# Patient Record
Sex: FEMALE
[De-identification: names, ages, dates, MRNs, and addresses within clinical notes are randomized per-mention and may not be internally consistent; named-entity substitution may affect disease eponyms.]

---

## 2018-06-20 PROBLEM — Z00.00 ENCOUNTER FOR PREVENTIVE HEALTH EXAMINATION: Status: ACTIVE | Noted: 2018-06-20

## 2018-06-21 ENCOUNTER — APPOINTMENT (OUTPATIENT)
Dept: OBGYN | Facility: CLINIC | Age: 28
End: 2018-06-21
Payer: COMMERCIAL

## 2018-06-21 VITALS
BODY MASS INDEX: 19.92 KG/M2 | DIASTOLIC BLOOD PRESSURE: 69 MMHG | WEIGHT: 111 LBS | SYSTOLIC BLOOD PRESSURE: 103 MMHG | HEIGHT: 62.5 IN | HEART RATE: 76 BPM

## 2018-06-21 DIAGNOSIS — Z01.419 ENCOUNTER FOR GYNECOLOGICAL EXAMINATION (GENERAL) (ROUTINE) W/OUT ABNORMAL FINDINGS: ICD-10-CM

## 2018-06-21 DIAGNOSIS — Z83.3 FAMILY HISTORY OF DIABETES MELLITUS: ICD-10-CM

## 2018-06-21 DIAGNOSIS — N76.0 ACUTE VAGINITIS: ICD-10-CM

## 2018-06-21 DIAGNOSIS — Z78.9 OTHER SPECIFIED HEALTH STATUS: ICD-10-CM

## 2018-06-21 PROCEDURE — 99385 PREV VISIT NEW AGE 18-39: CPT

## 2018-06-21 PROCEDURE — 99213 OFFICE O/P EST LOW 20 MIN: CPT | Mod: 25

## 2018-07-08 PROBLEM — Z83.3 FAMILY HISTORY OF DIABETES MELLITUS: Status: ACTIVE | Noted: 2018-07-08

## 2018-07-08 LAB
C TRACH RRNA SPEC QL NAA+PROBE: NOT DETECTED
CANDIDA VAG CYTO: DETECTED
CYTOLOGY CVX/VAG DOC THIN PREP: NORMAL
G VAGINALIS+PREV SP MTYP VAG QL MICRO: NOT DETECTED
HBV SURFACE AG SER QL: NONREACTIVE
HCV AB SER QL: NONREACTIVE
HCV S/CO RATIO: 0.17 S/CO
HIV1+2 AB SPEC QL IA.RAPID: NONREACTIVE
N GONORRHOEA RRNA SPEC QL NAA+PROBE: NOT DETECTED
SOURCE AMPLIFICATION: NORMAL
T PALLIDUM AB SER QL IA: NEGATIVE
T VAGINALIS VAG QL WET PREP: NOT DETECTED

## 2018-07-08 RX ORDER — NITROFURANTOIN (MONOHYDRATE/MACROCRYSTALS) 25; 75 MG/1; MG/1
100 CAPSULE ORAL
Qty: 14 | Refills: 0 | Status: COMPLETED | COMMUNITY
Start: 2018-05-23

## 2018-07-08 RX ORDER — METRONIDAZOLE 7.5 MG/G
0.75 GEL VAGINAL
Qty: 70 | Refills: 0 | Status: COMPLETED | COMMUNITY
Start: 2018-05-25

## 2018-07-08 RX ORDER — METHYLPREDNISOLONE 4 MG/1
4 TABLET ORAL
Qty: 21 | Refills: 0 | Status: COMPLETED | COMMUNITY
Start: 2018-05-25

## 2018-07-08 RX ORDER — FLUCONAZOLE 150 MG/1
150 TABLET ORAL
Qty: 1 | Refills: 0 | Status: COMPLETED | COMMUNITY
Start: 2018-05-23

## 2018-07-11 ENCOUNTER — OTHER (OUTPATIENT)
Age: 28
End: 2018-07-11

## 2018-07-19 ENCOUNTER — OTHER (OUTPATIENT)
Age: 28
End: 2018-07-19

## 2018-11-27 ENCOUNTER — EMERGENCY (EMERGENCY)
Facility: HOSPITAL | Age: 28
LOS: 1 days | Discharge: HOME | End: 2018-11-27
Attending: EMERGENCY MEDICINE
Payer: COMMERCIAL

## 2018-11-27 VITALS
TEMPERATURE: 99 F | HEART RATE: 81 BPM | DIASTOLIC BLOOD PRESSURE: 73 MMHG | RESPIRATION RATE: 18 BRPM | OXYGEN SATURATION: 99 % | SYSTOLIC BLOOD PRESSURE: 108 MMHG

## 2018-11-27 VITALS
HEART RATE: 74 BPM | TEMPERATURE: 98 F | WEIGHT: 110.01 LBS | OXYGEN SATURATION: 98 % | SYSTOLIC BLOOD PRESSURE: 119 MMHG | RESPIRATION RATE: 18 BRPM | DIASTOLIC BLOOD PRESSURE: 78 MMHG | HEIGHT: 72 IN

## 2018-11-27 LAB
APPEARANCE UR: ABNORMAL
BACTERIA # UR AUTO: ABNORMAL
BILIRUB UR-MCNC: ABNORMAL
COLOR SPEC: ABNORMAL
DIFF PNL FLD: ABNORMAL
EPI CELLS # UR: 1 — SIGNIFICANT CHANGE UP
GLUCOSE UR QL: NEGATIVE — SIGNIFICANT CHANGE UP
HYALINE CASTS # UR AUTO: 3 /LPF — SIGNIFICANT CHANGE UP (ref 0–7)
KETONES UR-MCNC: ABNORMAL
LEUKOCYTE ESTERASE UR-ACNC: ABNORMAL
NITRITE UR-MCNC: POSITIVE
PH UR: 6.5 — SIGNIFICANT CHANGE UP (ref 5–8)
PROT UR-MCNC: ABNORMAL
RBC CASTS # UR COMP ASSIST: 221 /HPF — HIGH (ref 0–4)
SP GR SPEC: 1.02 — SIGNIFICANT CHANGE UP (ref 1.01–1.02)
UROBILINOGEN FLD QL: ABNORMAL
WBC UR QL: 2333 /HPF — HIGH (ref 0–5)

## 2018-11-27 PROCEDURE — 99283 EMERGENCY DEPT VISIT LOW MDM: CPT

## 2018-11-27 PROCEDURE — 87086 URINE CULTURE/COLONY COUNT: CPT

## 2018-11-27 PROCEDURE — 87186 SC STD MICRODIL/AGAR DIL: CPT

## 2018-11-27 PROCEDURE — 81001 URINALYSIS AUTO W/SCOPE: CPT

## 2018-11-27 RX ORDER — CEPHALEXIN 500 MG
500 CAPSULE ORAL ONCE
Qty: 0 | Refills: 0 | Status: COMPLETED | OUTPATIENT
Start: 2018-11-27 | End: 2018-11-27

## 2018-11-27 RX ORDER — IBUPROFEN 200 MG
600 TABLET ORAL ONCE
Qty: 0 | Refills: 0 | Status: COMPLETED | OUTPATIENT
Start: 2018-11-27 | End: 2018-11-27

## 2018-11-27 RX ORDER — CEPHALEXIN 500 MG
1 CAPSULE ORAL
Qty: 20 | Refills: 0 | OUTPATIENT
Start: 2018-11-27 | End: 2018-12-06

## 2018-11-27 RX ADMIN — Medication 600 MILLIGRAM(S): at 15:00

## 2018-11-27 RX ADMIN — Medication 600 MILLIGRAM(S): at 14:19

## 2018-11-27 RX ADMIN — Medication 500 MILLIGRAM(S): at 15:03

## 2018-11-27 NOTE — ED PROVIDER NOTE - ATTENDING CONTRIBUTION TO CARE
Dahiana Galvez MD - Attending Physician: I have personally seen and examined this patient with the resident/fellow.  I have fully participated in the care of this patient. I have reviewed all pertinent clinical information, including history, physical exam, plan and the Resident/Fellow’s note and agree except as noted. See MDM

## 2018-11-27 NOTE — ED ADULT NURSE NOTE - OBJECTIVE STATEMENT
1347 28 yr old  female c/o severe right flank pain today. Hx of burning upon urination/UTI x 2 wks. Denies fever or chills. Taking pyridium all week. Looks uncomfortable. A&Ox4. ambulatory. +R CVA tenderness. colo9r pink. skin W&D. Denies N/V

## 2018-11-27 NOTE — ED PROVIDER NOTE - MEDICAL DECISION MAKING DETAILS
29 y/o F w no significant pmhx or pshx p/w 1 day of r flank pain, in setting of 2 weeks dysuria and hematuria, never treated.  likely uti and or pyelo. Plan:  check urine, and hcg , pain control, reassess. 27 y/o F w no significant pmhx or pshx p/w 1 day of r flank pain, in setting of 2 weeks dysuria and hematuria, never treated.  likely uti and or pyelo. Plan:  check urine, and hcg , pain control, reassess.    Dahiana Galvez MD - Attending Physician: Pt here with urinary symptoms, now with flank pain. No fever, no vomiting. Exam unremarkable. Pain control, check UA

## 2018-11-27 NOTE — ED PROVIDER NOTE - CARE PLAN
Principal Discharge DX:	Pyelonephritis  Assessment and plan of treatment:	You were seen in the emergency department for right flank pain. Please follow up with your primary doctor in the next 5-7 days. Take Keflex 500 milligrams twice a day for the next 10 days. Return to the emergency department immediately if you experience vomiting, fever, difficulty urinating or any other concerning symptoms.

## 2018-11-27 NOTE — ED PROVIDER NOTE - OBJECTIVE STATEMENT
27 y/o F w no significant pmhx or pshx p/w non-radiating generalized right sided flank  pain x1day worsening today w associated r sided h/a. Pain described as constant, aggravated by standing on her right foot. Relieved by staying still. Did not endorse meds for pain. Pt says pain feels similar to her past yeast infection. Of note, pt states she's had a UTI x2 weeks (unconfirmed) because she's had urinary frequency, dysuria, and hematuria. Has been endorsing pyridium  for UTI.  Denies fevers, emesis, diarrhea, hematuria or other complaints.. No hx of kidney stones. LMP 1 month ago. Not on daily meds. No abd surgeries. Allergic to sulfa- hives 27 y/o F w no significant pmhx or pshx p/w non-radiating generalized right sided flank pain x1day worsening today w associated r sided h/a. Pain described as constant, aggravated by standing on her right foot. Relieved by staying still. Did not take meds for pain. Pt says pain feels similar to her past yeast infection. Of note, pt states she's had a UTI x2 weeks (unconfirmed) because she's had urinary frequency, dysuria, and hematuria. Has been taking pyridium for these sx.  Denies fevers, emesis, diarrhea, hematuria or other complaints. No hx of kidney stones. LMP 1 month ago. Not on daily meds. No abd surgeries. Allergic to sulfa- hives 29 y/o F w no significant pmhx or pshx p/w non-radiating generalized right sided flank pain x1day worsening today w associated r sided h/a. Pain described as constant, aggravated by standing on her right foot. Relieved by staying still. Did not take meds for pain. Pt says pain feels similar to her past yeast infection. Of note, pt states she's had a UTI x2 weeks (unconfirmed) because she's had urinary frequency, dysuria, and hematuria. Has been taking pyridium for these sx. Reports not history of confirmed UTI, had yeast infection in past, went to UC, given diflucan and given bactrim "just in case" but did not have a ua done. Denies fevers, emesis, diarrhea, hematuria or other complaints. No hx of kidney stones. LMP 1 month ago. Not on daily meds. No abd surgeries. Allergic to sulfa- hives

## 2018-11-27 NOTE — ED ADULT NURSE NOTE - NSIMPLEMENTINTERV_GEN_ALL_ED
Implemented All Universal Safety Interventions:  [place] to call system. Call bell, personal items and telephone within reach. Instruct patient to call for assistance. Room bathroom lighting operational. Non-slip footwear when patient is off stretcher. Physically safe environment: no spills, clutter or unnecessary equipment. Stretcher in lowest position, wheels locked, appropriate side rails in place.

## 2018-11-27 NOTE — ED PROVIDER NOTE - PROGRESS NOTE DETAILS
Elizabeth Goldberger PGY-2: ua w obvious infection. Pt feeling well, will treat for pyelo, first dose keflex here Elizabeth Goldberger PGY-2: ua w obvious infection. Pt feeling well, nl vitals. Will treat for pyelo, first dose keflex here Elizabeth Goldberger PGY-2: ua w obvious infection. Pt feeling well, no pain, no fevers here. Tolerating cookies and drinking in ED without issue. Nl vitals. Will treat for pyelo, first dose keflex here

## 2018-11-27 NOTE — ED PROVIDER NOTE - PLAN OF CARE
You were seen in the emergency department for right flank pain. Please follow up with your primary doctor in the next 5-7 days. Take Keflex 500 milligrams twice a day for the next 10 days. Return to the emergency department immediately if you experience vomiting, fever, difficulty urinating or any other concerning symptoms.

## 2018-11-27 NOTE — ED ADULT TRIAGE NOTE - CHIEF COMPLAINT QUOTE
Patient presents with right flank pain that started today. Patient has hx of currently UTI. Patient endorses headache. Denies fevers.

## 2018-11-29 NOTE — ED POST DISCHARGE NOTE - RESULT SUMMARY
UCx prelim + >100,000 CFU/mL E.Coli. Patient d/c with prescription for keflex. Will f/u final culture results and sensitivities. - Laura Cevallos PA-C

## 2019-03-11 ENCOUNTER — EMERGENCY (EMERGENCY)
Facility: HOSPITAL | Age: 29
LOS: 1 days | Discharge: HOME | End: 2019-03-11
Attending: EMERGENCY MEDICINE | Admitting: EMERGENCY MEDICINE
Payer: COMMERCIAL

## 2019-03-11 VITALS
RESPIRATION RATE: 14 BRPM | DIASTOLIC BLOOD PRESSURE: 76 MMHG | HEART RATE: 65 BPM | OXYGEN SATURATION: 100 % | TEMPERATURE: 98 F | SYSTOLIC BLOOD PRESSURE: 108 MMHG

## 2019-03-11 LAB
APPEARANCE UR: CLEAR — SIGNIFICANT CHANGE UP
BACTERIA # UR AUTO: NEGATIVE — SIGNIFICANT CHANGE UP
BILIRUB UR-MCNC: NEGATIVE — SIGNIFICANT CHANGE UP
BLOOD UR QL VISUAL: HIGH
COLOR SPEC: SIGNIFICANT CHANGE UP
GLUCOSE UR-MCNC: NEGATIVE — SIGNIFICANT CHANGE UP
HYALINE CASTS # UR AUTO: NEGATIVE — SIGNIFICANT CHANGE UP
KETONES UR-MCNC: SIGNIFICANT CHANGE UP
LEUKOCYTE ESTERASE UR-ACNC: NEGATIVE — SIGNIFICANT CHANGE UP
NITRITE UR-MCNC: NEGATIVE — SIGNIFICANT CHANGE UP
PH UR: 6 — SIGNIFICANT CHANGE UP (ref 5–8)
PROT UR-MCNC: NEGATIVE — SIGNIFICANT CHANGE UP
RBC CASTS # UR COMP ASSIST: SIGNIFICANT CHANGE UP (ref 0–?)
SP GR SPEC: 1.01 — SIGNIFICANT CHANGE UP (ref 1–1.04)
SQUAMOUS # UR AUTO: SIGNIFICANT CHANGE UP
UROBILINOGEN FLD QL: NORMAL — SIGNIFICANT CHANGE UP
WBC UR QL: SIGNIFICANT CHANGE UP (ref 0–?)

## 2019-03-11 PROCEDURE — 99283 EMERGENCY DEPT VISIT LOW MDM: CPT

## 2019-03-11 RX ORDER — KETOROLAC TROMETHAMINE 30 MG/ML
30 SYRINGE (ML) INJECTION ONCE
Qty: 0 | Refills: 0 | Status: DISCONTINUED | OUTPATIENT
Start: 2019-03-11 | End: 2019-03-11

## 2019-03-11 RX ORDER — LIDOCAINE 4 G/100G
1 CREAM TOPICAL ONCE
Qty: 0 | Refills: 0 | Status: COMPLETED | OUTPATIENT
Start: 2019-03-11 | End: 2019-03-11

## 2019-03-11 RX ADMIN — LIDOCAINE 1 PATCH: 4 CREAM TOPICAL at 21:01

## 2019-03-11 NOTE — ED ADULT NURSE NOTE - OBJECTIVE STATEMENT
Pt presents to intake, a&oX4, ambulatory w/o assistance, pmhx of UTI and kidney infections, here for evaluation of right sided flank pain. Denies chest pain, shortness of breath, palpitations, diaphoresis, headaches, fevers, dizziness, nausea, vomiting, diarrhea, or urinary symptoms at this time. Pt does not want pain meds until urine results come back. No IV. Pt stable and in NAD.

## 2019-03-11 NOTE — ED ADULT TRIAGE NOTE - CHIEF COMPLAINT QUOTE
pt c/o rt flank x2 days, denies urinary symptoms, denies fevers at home, pt states has hx UTI's and kidney infection.

## 2019-03-11 NOTE — ED PROVIDER NOTE - OBJECTIVE STATEMENT
28 yr old female with no sig PMH (has had UTI once and states "kidney infection" with left flank pain for 2 days.  States pain only comes on with movement.  Denies urinary symptoms.  is currently menstruating so unclear if she is having hematuria.  Denies fever, vomiting.  States has been going to gym regularly but does not remember pulling a muscle or hurting her back.

## 2019-03-11 NOTE — ED PROVIDER NOTE - PROGRESS NOTE DETAILS
Reynaldo: feels better; UA shows blood (menstruating); in no distress; not a kidney stone clinically; will d/c home

## 2019-03-11 NOTE — ED PROVIDER NOTE - NSFOLLOWUPINSTRUCTIONS_ED_ALL_ED_FT
Follow up with your PCP  return to ED for fever, vomiting, symptoms in which you feel you require immediate attention

## 2019-03-11 NOTE — ED ADULT NURSE NOTE - NSIMPLEMENTINTERV_GEN_ALL_ED
Implemented All Universal Safety Interventions:  [place] to call system. Call bell, personal items and telephone within reach. Instruct patient to call for assistance. Room bathroom lighting operational. Non-slip footwear when patient is off stretcher. Physically safe environment: no spills, clutter or unnecessary equipment. Stretcher in lowest position, wheels locked, appropriate side rails in place.

## 2020-03-01 NOTE — ED ADULT NURSE NOTE - NSFALLRSKUNASSIST_ED_ALL_ED
CERTIFICATE OF WORK  December 14, 2018        Re: Duane R Harry Jr.  [address] [place] [address]      This is to certify that Duane R Harry Jr. has been under my care from 12/14/2018 and may return to work on 15 December 2018.              SIGNATURE:___________________________________________,   12/14/2018  Maggie Wiggins PA-C      [address] [facility] [phone]   no well-developed/well-groomed

## 2020-11-16 NOTE — ED PROVIDER NOTE - CROS ED [facility] ALL NEG
Would you refer Tram to DM education? She needs help learning how to test and to use the insulin pen. negative...

## 2020-12-16 PROBLEM — N76.0 ACUTE VAGINITIS: Status: RESOLVED | Noted: 2018-06-21 | Resolved: 2020-12-16

## 2021-05-21 NOTE — ED PROVIDER NOTE - CROS ED [facility] ALL NEG
Patient from home   will need medical optimization prior to D/C   pending pt eval - - - Patient from home   will need medical optimization prior to D/C   will likely D/C on keflex PO  pending pt eval
